# Patient Record
Sex: FEMALE | Race: WHITE | NOT HISPANIC OR LATINO | Employment: FULL TIME | URBAN - METROPOLITAN AREA
[De-identification: names, ages, dates, MRNs, and addresses within clinical notes are randomized per-mention and may not be internally consistent; named-entity substitution may affect disease eponyms.]

---

## 2018-11-12 ENCOUNTER — HOSPITAL ENCOUNTER (OUTPATIENT)
Dept: MAMMOGRAPHY | Facility: HOSPITAL | Age: 58
Discharge: HOME | End: 2018-11-12
Attending: SURGERY | Admitting: SURGERY

## 2021-01-02 ENCOUNTER — HOSPITAL ENCOUNTER (EMERGENCY)
Facility: HOSPITAL | Age: 61
Discharge: HOME | End: 2021-01-02
Attending: EMERGENCY MEDICINE | Admitting: EMERGENCY MEDICINE

## 2021-01-02 VITALS
RESPIRATION RATE: 18 BRPM | WEIGHT: 293 LBS | SYSTOLIC BLOOD PRESSURE: 155 MMHG | OXYGEN SATURATION: 97 % | TEMPERATURE: 98.9 F | HEART RATE: 98 BPM | BODY MASS INDEX: 53.92 KG/M2 | HEIGHT: 62 IN | DIASTOLIC BLOOD PRESSURE: 80 MMHG

## 2021-01-02 DIAGNOSIS — L02.214 ABSCESS OF RIGHT GROIN: Primary | ICD-10-CM

## 2021-01-02 PROCEDURE — 99282 EMERGENCY DEPT VISIT SF MDM: CPT

## 2021-01-02 RX ORDER — HYDROCODONE BITARTRATE AND ACETAMINOPHEN 7.5; 325 MG/1; MG/1
1 TABLET ORAL EVERY 6 HOURS PRN
Qty: 12 TABLET | Refills: 0 | Status: SHIPPED | OUTPATIENT
Start: 2021-01-02

## 2021-01-02 RX ORDER — ONDANSETRON 4 MG/1
4 TABLET, ORALLY DISINTEGRATING ORAL EVERY 8 HOURS PRN
Qty: 10 TABLET | Refills: 0 | Status: SHIPPED | OUTPATIENT
Start: 2021-01-02

## 2021-01-02 RX ORDER — DOXYCYCLINE HYCLATE 100 MG/1
100 CAPSULE ORAL 2 TIMES DAILY
Qty: 20 CAPSULE | Refills: 0 | OUTPATIENT
Start: 2021-01-02 | End: 2022-06-26

## 2021-01-02 NOTE — ED NOTES
Pt reports she lanced part of the wound by herself at home with a sterilzed needle.      Pamela Garrett RN  01/02/21 0664

## 2021-01-02 NOTE — ED PROVIDER NOTES
"Subjective   Chief complaint: Staph infection    60-year-old female presents stating she thinks she has a staph infection.  She states over the past 2 or 3 days she has developed some swelling to the right groin area.  She states she has had abscesses in the past.  She states she had a sterile needle at home and she lanced the wound herself yesterday.  She states it continues to drain a purulent fluid.  She has had no fever.  She denies any alleviating or exacerbating factors.  She does report some nausea and vomiting.      History provided by:  Patient      Review of Systems   Constitutional: Negative for fever.   HENT: Negative for congestion.    Respiratory: Negative for cough and shortness of breath.    Cardiovascular: Negative for chest pain.   Gastrointestinal: Negative for abdominal pain.   Neurological: Negative for headaches.       No past medical history on file.    Allergies   Allergen Reactions   • Ampicillin Rash   • Etodolac Rash   • Sulfamethoxazole-Trimethoprim Anaphylaxis   • Atorvastatin Calcium Nausea And Vomiting   • Fluocinolone Acetonide Unknown - Low Severity     GASTRITIS   • Metformin Nausea And Vomiting     Gastritis   • Brompheniramine Rash   • Sulfa Antibiotics Rash       No past surgical history on file.    No family history on file.    Social History     Socioeconomic History   • Marital status:      Spouse name: Not on file   • Number of children: Not on file   • Years of education: Not on file   • Highest education level: Not on file       /83 (BP Location: Left arm, Patient Position: Sitting)   Pulse 104   Temp 99.5 °F (37.5 °C) (Oral)   Resp 20   Ht 157.5 cm (62\")   Wt (!) 138 kg (305 lb)   SpO2 96%   BMI 55.79 kg/m²       Objective   Physical Exam  Vitals signs and nursing note reviewed.   Constitutional:       Appearance: Normal appearance. She is obese.   HENT:      Head: Normocephalic and atraumatic.   Cardiovascular:      Rate and Rhythm: Normal rate and " regular rhythm.      Heart sounds: Normal heart sounds.   Pulmonary:      Effort: Pulmonary effort is normal. No respiratory distress.      Breath sounds: Normal breath sounds.   Skin:     Comments: There is an area of swelling and induration with tenderness on palpation to the right groin area.  This is not a labial abscess.  It is in the right upper thigh near the groin.  It is actively draining purulent fluid.   Neurological:      Mental Status: She is alert and oriented to person, place, and time.         Procedures           ED Course                                           MDM   The abscess is actively draining.  I see no indication for further I&D at this time.  Patient will be started on doxycycline.  She will also be given a small prescription for Norco and Zofran.  She will follow up with her primary doctor.      Final diagnoses:   Abscess of right groin            Riki Parsons MD  01/02/21 1800

## 2022-06-26 ENCOUNTER — HOSPITAL ENCOUNTER (EMERGENCY)
Facility: HOSPITAL | Age: 62
Discharge: HOME | End: 2022-06-26
Attending: EMERGENCY MEDICINE | Admitting: EMERGENCY MEDICINE

## 2022-06-26 ENCOUNTER — APPOINTMENT (OUTPATIENT)
Dept: GENERAL RADIOLOGY | Facility: HOSPITAL | Age: 62
End: 2022-06-26

## 2022-06-26 VITALS
HEIGHT: 63 IN | HEART RATE: 102 BPM | BODY MASS INDEX: 51.91 KG/M2 | RESPIRATION RATE: 18 BRPM | WEIGHT: 293 LBS | SYSTOLIC BLOOD PRESSURE: 168 MMHG | TEMPERATURE: 98.2 F | OXYGEN SATURATION: 98 % | DIASTOLIC BLOOD PRESSURE: 99 MMHG

## 2022-06-26 DIAGNOSIS — R03.0 ELEVATED BLOOD PRESSURE READING: ICD-10-CM

## 2022-06-26 DIAGNOSIS — J44.1 COPD EXACERBATION: Primary | ICD-10-CM

## 2022-06-26 LAB
ALBUMIN SERPL-MCNC: 3.5 G/DL (ref 3.5–5.2)
ALBUMIN/GLOB SERPL: 1 G/DL
ALP SERPL-CCNC: 79 U/L (ref 39–117)
ALT SERPL W P-5'-P-CCNC: 25 U/L (ref 1–33)
ANION GAP SERPL CALCULATED.3IONS-SCNC: 11 MMOL/L (ref 5–15)
AST SERPL-CCNC: 28 U/L (ref 1–32)
BASOPHILS # BLD AUTO: 0 10*3/MM3 (ref 0–0.2)
BASOPHILS NFR BLD AUTO: 0.4 % (ref 0–1.5)
BILIRUB SERPL-MCNC: 0.5 MG/DL (ref 0–1.2)
BUN SERPL-MCNC: 11 MG/DL (ref 8–23)
BUN/CREAT SERPL: 16.9 (ref 7–25)
CALCIUM SPEC-SCNC: 9 MG/DL (ref 8.6–10.5)
CHLORIDE SERPL-SCNC: 95 MMOL/L (ref 98–107)
CO2 SERPL-SCNC: 28 MMOL/L (ref 22–29)
CREAT SERPL-MCNC: 0.65 MG/DL (ref 0.57–1)
DEPRECATED RDW RBC AUTO: 41.1 FL (ref 37–54)
EGFRCR SERPLBLD CKD-EPI 2021: 100.9 ML/MIN/1.73
EOSINOPHIL # BLD AUTO: 0.1 10*3/MM3 (ref 0–0.4)
EOSINOPHIL NFR BLD AUTO: 1.1 % (ref 0.3–6.2)
ERYTHROCYTE [DISTWIDTH] IN BLOOD BY AUTOMATED COUNT: 13.6 % (ref 12.3–15.4)
GLOBULIN UR ELPH-MCNC: 3.6 GM/DL
GLUCOSE SERPL-MCNC: 417 MG/DL (ref 65–99)
HCT VFR BLD AUTO: 47.1 % (ref 34–46.6)
HGB BLD-MCNC: 15.8 G/DL (ref 12–15.9)
LYMPHOCYTES # BLD AUTO: 1.6 10*3/MM3 (ref 0.7–3.1)
LYMPHOCYTES NFR BLD AUTO: 15.1 % (ref 19.6–45.3)
MCH RBC QN AUTO: 28.8 PG (ref 26.6–33)
MCHC RBC AUTO-ENTMCNC: 33.6 G/DL (ref 31.5–35.7)
MCV RBC AUTO: 86 FL (ref 79–97)
MONOCYTES # BLD AUTO: 0.8 10*3/MM3 (ref 0.1–0.9)
MONOCYTES NFR BLD AUTO: 7.5 % (ref 5–12)
NEUTROPHILS NFR BLD AUTO: 75.9 % (ref 42.7–76)
NEUTROPHILS NFR BLD AUTO: 8 10*3/MM3 (ref 1.7–7)
NRBC BLD AUTO-RTO: 0 /100 WBC (ref 0–0.2)
NT-PROBNP SERPL-MCNC: 33.7 PG/ML (ref 0–900)
PLATELET # BLD AUTO: 186 10*3/MM3 (ref 140–450)
PMV BLD AUTO: 8.2 FL (ref 6–12)
POTASSIUM SERPL-SCNC: 4.3 MMOL/L (ref 3.5–5.2)
PROCALCITONIN SERPL-MCNC: 0.13 NG/ML (ref 0–0.25)
PROT SERPL-MCNC: 7.1 G/DL (ref 6–8.5)
QT INTERVAL: 358 MS
RBC # BLD AUTO: 5.48 10*6/MM3 (ref 3.77–5.28)
SODIUM SERPL-SCNC: 134 MMOL/L (ref 136–145)
TROPONIN T SERPL-MCNC: <0.01 NG/ML (ref 0–0.03)
WBC NRBC COR # BLD: 10.5 10*3/MM3 (ref 3.4–10.8)

## 2022-06-26 PROCEDURE — 84145 PROCALCITONIN (PCT): CPT | Performed by: EMERGENCY MEDICINE

## 2022-06-26 PROCEDURE — 71045 X-RAY EXAM CHEST 1 VIEW: CPT

## 2022-06-26 PROCEDURE — 94761 N-INVAS EAR/PLS OXIMETRY MLT: CPT

## 2022-06-26 PROCEDURE — 94664 DEMO&/EVAL PT USE INHALER: CPT

## 2022-06-26 PROCEDURE — 94799 UNLISTED PULMONARY SVC/PX: CPT

## 2022-06-26 PROCEDURE — 85025 COMPLETE CBC W/AUTO DIFF WBC: CPT | Performed by: EMERGENCY MEDICINE

## 2022-06-26 PROCEDURE — 84484 ASSAY OF TROPONIN QUANT: CPT | Performed by: EMERGENCY MEDICINE

## 2022-06-26 PROCEDURE — 99283 EMERGENCY DEPT VISIT LOW MDM: CPT

## 2022-06-26 PROCEDURE — 94640 AIRWAY INHALATION TREATMENT: CPT

## 2022-06-26 PROCEDURE — 83880 ASSAY OF NATRIURETIC PEPTIDE: CPT | Performed by: EMERGENCY MEDICINE

## 2022-06-26 PROCEDURE — 80053 COMPREHEN METABOLIC PANEL: CPT | Performed by: EMERGENCY MEDICINE

## 2022-06-26 PROCEDURE — 93005 ELECTROCARDIOGRAM TRACING: CPT | Performed by: EMERGENCY MEDICINE

## 2022-06-26 RX ORDER — IPRATROPIUM BROMIDE AND ALBUTEROL SULFATE 2.5; .5 MG/3ML; MG/3ML
3 SOLUTION RESPIRATORY (INHALATION) ONCE
Status: COMPLETED | OUTPATIENT
Start: 2022-06-26 | End: 2022-06-26

## 2022-06-26 RX ORDER — DOXYCYCLINE 100 MG/1
100 CAPSULE ORAL 2 TIMES DAILY
Qty: 14 CAPSULE | Refills: 0 | Status: SHIPPED | OUTPATIENT
Start: 2022-06-26

## 2022-06-26 RX ORDER — ALBUTEROL SULFATE 2.5 MG/3ML
2.5 SOLUTION RESPIRATORY (INHALATION) EVERY 4 HOURS PRN
Qty: 30 EACH | Refills: 0 | Status: SHIPPED | OUTPATIENT
Start: 2022-06-26

## 2022-06-26 RX ORDER — CLONIDINE HYDROCHLORIDE 0.1 MG/1
0.1 TABLET ORAL ONCE
Status: COMPLETED | OUTPATIENT
Start: 2022-06-26 | End: 2022-06-26

## 2022-06-26 RX ADMIN — CLONIDINE HYDROCHLORIDE 0.1 MG: 0.1 TABLET ORAL at 17:01

## 2022-06-26 RX ADMIN — IPRATROPIUM BROMIDE AND ALBUTEROL SULFATE 3 ML: .5; 3 SOLUTION RESPIRATORY (INHALATION) at 16:30

## 2022-06-26 NOTE — ED PROVIDER NOTES
Subjective   History of Present Illness  History Provided By: Patient    Chief Complaint: Shortness of breath  Onset: 1 month ago  Timing: Worsening  Location: Pulmonary  Quality: Ran out of albuterol nebulizers and having increasing wheezing  Severity: Moderate  Modifying Factors: Albuterol inhalers not helping as much as nebulizer did previously    Other: Patient with prior medical history of COPD presents for cough, shortness of breath, wheezing.  Been going on for approximately a month but worsened over the last several days.  Increasing shortness of breath.  States that she feels like the shortness of breath causing her to have panic attacks.      Review of Systems   Constitutional: Negative for chills and fever.   Respiratory: Positive for cough, shortness of breath and wheezing.    Cardiovascular: Positive for leg swelling. Negative for chest pain.   All other systems reviewed and are negative.      History reviewed. No pertinent past medical history.    Allergies   Allergen Reactions   • Ampicillin Rash   • Etodolac Rash   • Sulfamethoxazole-Trimethoprim Anaphylaxis   • Atorvastatin Calcium Nausea And Vomiting   • Fluocinolone Acetonide Unknown - Low Severity     GASTRITIS   • Metformin Nausea And Vomiting     Gastritis   • Brompheniramine Rash   • Sulfa Antibiotics Rash       History reviewed. No pertinent surgical history.    History reviewed. No pertinent family history.    Social History     Socioeconomic History   • Marital status:            Objective   Physical Exam  Constitutional:  No acute distress.  Head:  Atraumatic.  Normocephalic.   Eyes:  No scleral icterus. Normal conjunctiva  ENT:  Moist mucosa.  No nasal discharge present.  Cardiovascular:  Well perfused.  Equal pulses.  Regular rate.  Normal capillary refill.    Pulmonary/Chest:  No respiratory distress.  Airway patent.  No tachypnea.  No accessory muscle usage.  Moderate expiratory wheezes bilaterally  Abdominal:  Non-distended.  Non-tender.   Extremities: 2+ bilateral peripheral edema.  No Deformity  Skin:  Warm, dry  Neurological:  Alert, awake, and appropriate.  Normal speech.      Procedures           ED Course  ED Course as of 06/26/22 1745   Sun Jun 26, 2022   1635 I spoke with the patient about her glucose, patient does report she has a history of diabetes, she reports that she has not been taking her insulin because she is not eating.  Advised the patient that her blood sugar was 417, and it would be advisable to decrease her blood sugar and we can monitor here in the ED, patient refuses insulin.    Sitting up on side the bed, she is resting comfortably.  No acute distress. [LB]   1726 Spoke with the patient again regarding her treatment plan.  Patient is very argumentative and defensive regarding her blood sugar, her insulin, and therapy.  I advised patient she would need to discuss her medications with her primary care provider.  I also advise follow-up regarding her blood pressure.  We discussed admission, and patient feels that she would not benefit from admission.  She is resting comfortably.  No acute respiratory distress. [LB]      ED Course User Index  [LB] Sujata Zamora, APRN                                           MDM  Number of Diagnoses or Management Options  COPD exacerbation (HCC)  Elevated blood pressure reading  Diagnosis management comments: Care patient from previous provider with DuoNeb and lab result pending.  I spoke with the patient extensively regarding her medications, please see my work-up notes.  Given patient has had symptoms for the past 4-week she will be given doxycycline.  Of advised her to follow-up with her primary care provider.  She is without any acute distress at disposition.       Amount and/or Complexity of Data Reviewed  Clinical lab tests: reviewed  Tests in the radiology section of CPT®: reviewed  Tests in the medicine section of CPT®: reviewed      EKG # 1  Signed and interpreted by the  EP.  Time Interpreted: 3:05 PM  Rate: 94  Rhythm: Normal sinus rhythm  Axis: Normal axis  Intervals: Normal intervals  ST Segments: No acute ischemic     Comparison: No significant change from October 12, 2018 EKG    Patient with bilateral expiratory wheezes.  Will give DuoNeb.  Has lower extremity edema but do not hear crackles.  Do not think patient appears volume overloaded.  Transfer of care at 4:30 PM.  Pending remainder labs, chest x-ray results, reevaluation after DuoNeb.  Suspect patient will to go home with refill of her albuterol nebulizer treatments.  Would recommend holding steroids given patient's elevated blood sugar.  Believe better medication compliance is all she will need.    Final diagnoses:   COPD exacerbation (HCC)   Elevated blood pressure reading       ED Disposition  ED Disposition     ED Disposition   Discharge    Condition   Stable    Comment   --             Mike Marino MD  [address] [facility] [place] [address] [phone]    Schedule an appointment as soon as possible for a visit       [facility] EMERGENCY DEPARTMENT  [address] [facility] [address] [phone]             Medication List      New Prescriptions    albuterol (2.5 MG/3ML) 0.083% nebulizer solution  Commonly known as: PROVENTIL  Take 2.5 mg by nebulization Every 4 (Four) Hours As Needed for Wheezing or Shortness of Air.     doxycycline 100 MG capsule  Commonly known as: MONODOX  Take 1 capsule by mouth 2 (Two) Times a Day.        Stop    doxycycline 100 MG capsule  Commonly known as: VIBRAMYCIN           Where to Get Your Medications      These medications were sent to [facility]/pharmacy #[id number] - [facility] [place] - [address] [phone] [place] [phone]   [address] [facility] [place] [address]    Hours: 24-hours Phone: [phone]   · albuterol (2.5 MG/3ML) 0.083% nebulizer solution  · doxycycline 100 MG capsule          Sujata Zamora, APRN  06/26/22 9903

## 2023-03-16 NOTE — DISCHARGE INSTRUCTIONS
Addended by: SHELIA GOFF on: 3/16/2023 07:30 AM     Modules accepted: Orders     Follow-up with your primary doctor.  Return to the emergency room for any new or worsening symptoms or if you have any other questions or concerns.  Take medications as prescribed.  Do not drive or drink alcohol while taking pain medication.

## 2023-09-28 ENCOUNTER — HOSPITAL ENCOUNTER (EMERGENCY)
Facility: HOSPITAL | Age: 63
Discharge: HOME | End: 2023-09-28
Attending: EMERGENCY MEDICINE
Payer: COMMERCIAL

## 2023-09-28 ENCOUNTER — APPOINTMENT (OUTPATIENT)
Dept: GENERAL RADIOLOGY | Facility: HOSPITAL | Age: 63
End: 2023-09-28
Payer: COMMERCIAL

## 2023-09-28 VITALS
OXYGEN SATURATION: 98 % | RESPIRATION RATE: 19 BRPM | SYSTOLIC BLOOD PRESSURE: 193 MMHG | BODY MASS INDEX: 42.17 KG/M2 | HEART RATE: 103 BPM | WEIGHT: 238 LBS | DIASTOLIC BLOOD PRESSURE: 109 MMHG | HEIGHT: 63 IN | TEMPERATURE: 97.6 F

## 2023-09-28 DIAGNOSIS — S60.221A CONTUSION OF RIGHT HAND, INITIAL ENCOUNTER: ICD-10-CM

## 2023-09-28 DIAGNOSIS — S80.01XA CONTUSION OF RIGHT KNEE, INITIAL ENCOUNTER: Primary | ICD-10-CM

## 2023-09-28 PROCEDURE — 73130 X-RAY EXAM OF HAND: CPT

## 2023-09-28 PROCEDURE — 73590 X-RAY EXAM OF LOWER LEG: CPT

## 2023-09-28 PROCEDURE — 73560 X-RAY EXAM OF KNEE 1 OR 2: CPT

## 2023-09-28 PROCEDURE — 99283 EMERGENCY DEPT VISIT LOW MDM: CPT

## 2023-09-28 RX ORDER — HYDROCODONE BITARTRATE AND ACETAMINOPHEN 10; 325 MG/1; MG/1
1 TABLET ORAL EVERY 4 HOURS PRN
Qty: 10 TABLET | Refills: 0 | Status: SHIPPED | OUTPATIENT
Start: 2023-09-28

## 2023-09-28 RX ORDER — HYDROCODONE BITARTRATE AND ACETAMINOPHEN 7.5; 325 MG/1; MG/1
1 TABLET ORAL ONCE
Status: COMPLETED | OUTPATIENT
Start: 2023-09-28 | End: 2023-09-28

## 2023-09-28 RX ADMIN — HYDROCODONE BITARTRATE AND ACETAMINOPHEN 1 TABLET: 7.5; 325 TABLET ORAL at 21:12

## 2023-09-29 NOTE — ED PROVIDER NOTES
MD ATTESTATION NOTE    The JULIAN and I have discussed this patient's history, physical exam, and treatment plan.  I have reviewed the documentation and personally had a face to face interaction with the patient. I affirm the documentation and agree with the treatment and plan.  The attached note describes my personal findings.      I provided a substantive portion of the care of the patient.  I personally performed the physical exam in its entirety, and below are my findings.      Brief HPI: 62-year-old female who presents the emergency room today after a fall yesterday.  The patient is complaining of right hand and right knee pain.  She states she did not hit her head or lose consciousness.  She states that her hips are not bothering her.    General : 62-year-old patient is awake alert and oriented no acute distress  HEENT: NCAT: C-spine nontender  Ext: Tenderness and swelling to dorsum of right hand with good range of motion of right hand.  Patient has tenderness to her right knee and proximal tibia.  The patient has no hip pain.  No spine pain.  She has 3+ pedal edema bilaterally which she states is chronic  Skin: No rash  Neuro: Awake with a nonfocal neuro exam  Psych: Normal mood and affect      Plan: We will x-ray the patient's hand, knee and tib-fib for further evaluation.  My independent interpretation the patient's right hand, knee and tib-fib x-rays are negative acute.  We will Ace wrap her knee and I will call her short course of narcotics and have her follow-up with orthopedic doctor.     Darius Coon MD  09/28/23 8320

## 2023-09-29 NOTE — ED PROVIDER NOTES
EMERGENCY DEPARTMENT ENCOUNTER    Room Number:  T02/02  PCP: Mike Marino MD      HPI:  Chief Complaint: Mechanical fall with right hand and right lower extremity injury  A complete HPI/ROS/PMH/PSH/SH/FH are unobtainable due to: Nothing  Context: Lisa Smith is a 62 y.o. female who presents to the ED c/o mechanical fall with right hand and right lower extremity injury.  Per the patient has chronic right knee pain and is bone-on-bone.  She states her right knee gave out causing her to fall to the ground directly on the right knee and the right hand.  She denies hitting her head or sustaining injury to the chest wall, abdomen, head, neck, back, hips, pelvis.  She states the pain in the knee is moderate at rest and significant when she attempts to ambulate.  The pain in the right hand is exacerbated when she attempts to  and grasp objects.  Patient states she is currently followed by [facility]..          PAST MEDICAL HISTORY  Active Ambulatory Problems     Diagnosis Date Noted    No Active Ambulatory Problems     Resolved Ambulatory Problems     Diagnosis Date Noted    No Resolved Ambulatory Problems     No Additional Past Medical History         PAST SURGICAL HISTORY  No past surgical history on file.      FAMILY HISTORY  No family history on file.      SOCIAL HISTORY  Social History     Socioeconomic History    Marital status:          ALLERGIES  Ampicillin, Etodolac, Sulfamethoxazole-trimethoprim, Atorvastatin calcium, Fluocinolone acetonide, Metformin, Brompheniramine, and Sulfa antibiotics        REVIEW OF SYSTEMS  Review of Systems     All systems reviewed and negative except for those discussed in HPI.       PHYSICAL EXAM  ED Triage Vitals [09/28/23 2021]   Temp Heart Rate Resp BP SpO2   97.6 °F (36.4 °C) 103 19 -- 98 %      Temp src Heart Rate Source Patient Position BP Location FiO2 (%)   -- -- -- -- --       Physical Exam      GENERAL: Very pleasant, nontoxic, no acute  distress  HENT: nares patent  EYES: no scleral icterus  CV: regular rhythm, normal rate DP and PT pulses +1 or 2 in palpable right lower extremity  RESPIRATORY: normal effort  ABDOMEN: soft, nontender, no signs of trauma  MUSCULOSKELETAL: Right hand: Soft tissue swelling and TTP over the fourth and fifth MCPs.  No obvious deformity.  Right knee: TTP over the patella and proximal tib-fib.  Compartments are soft.    NEURO: alert, moves all extremities, follows commands  PSYCH:  calm, cooperative  SKIN: warm, dry    Vital signs and nursing notes reviewed.          LAB RESULTS  No results found for this or any previous visit (from the past 24 hour(s)).    Ordered the above labs and reviewed the results.        RADIOLOGY  XR Knee 1 or 2 View Right, XR Tibia Fibula 2 View Right, XR Hand 3+ View Right    Result Date: 9/28/2023  XR KNEE 1 OR 2 VW RIGHT-, XR TIBIA FIBULA 2 VW RIGHT-, XR HAND 3+ VW RIGHT-09/28/2023  HISTORY: Fell with multiple injuries.  RIGHT KNEE 2 VIEWS  AP and lateral views of the right knee show tibiofemoral joint space narrowing most pronounced medially. There is soft tissue swelling of the lower extremity. Hypertrophic changes are seen.  No fractures or other acute bony abnormalities are seen.  RIGHT TIBIA AND FIBULA 2 VIEWS  AP and lateral views of the right tibia and fibula show soft tissue swelling of the lower leg with no fractures or other acute bony abnormalities.  RIGHT HAND 3 VIEWS  There may be minimal degenerative arthritis of the interphalangeal joints. Mildly heterogeneous density of the midshaft of the right fifth metacarpal is seen. There may be some minimal bony expansion. This is probably a benign lesion such as an enchondroma.  No acute fractures or dislocations are seen.  Comment: Review report.        Ordered the above noted radiological studies. Reviewed by me in PACS.          PROCEDURES  Procedures          MEDICATIONS GIVEN IN ER  Medications   HYDROcodone-acetaminophen (NORCO)  7.5-325 MG per tablet 1 tablet (1 tablet Oral Given 9/28/23 2112)         MEDICAL DECISION MAKING, PROGRESS, and CONSULTS    Discussion below represents my analysis of pertinent findings related to patient's condition, differential diagnosis, treatment plan and final disposition.      Orders placed during this visit:  Orders Placed This Encounter   Procedures    XR Knee 1 or 2 View Right    XR Tibia Fibula 2 View Right    XR Hand 3+ View Right         Additional sources:  - Discussed/obtained information from independent historians: None available  Additional information was obtained to confirm the patient's history.    - External (non-ED) record review: Patient was seen by Dr. Roberts with [facility] and had PFTs performed 9/25/2023.  Findings were consistent with restrictive pattern lung disease.            - Chronic or social conditions impacting care: None        Differential diagnosis:    Right hand fracture, patella fracture, tibial plateau fracture, proximal tib-fib fracture.  Will obtain plain film x-rays of the right knee, right tib-fib, right hand to further evaluate        Independent interpretation of labs, radiology studies, and discussions with consultants:  ED Course as of 09/29/23 0535   Thu Sep 28, 2023   2112 I viewed the patient's right hand, right tib-fib, right knee x-rays.  There appears to be an old healed fracture in the right hand to the fifth MC.  There is significant DJD in the right knee.  I see no acute fracture.  We will wait for the radiologist's official reads. [RC]   9060 Official right hand x-ray:RIGHT HAND 3 VIEWS     There may be minimal degenerative arthritis of the interphalangeal  joints. Mildly heterogeneous density of the midshaft of the right fifth  metacarpal is seen. There may be some minimal bony expansion. This is  probably a benign lesion such as an enchondroma.     No acute fractures or dislocations are seen.     Comment: Review report.       [RC]   4537  RIGHT TIBIA AND FIBULA 2 VIEWS     AP and lateral views of the right tibia and fibula show soft tissue  swelling of the lower leg with no fractures or other acute bony  abnormalities.      [RC]   2146 RIGHT KNEE 2 VIEWS     AP and lateral views of the right knee show tibiofemoral joint space  narrowing most pronounced medially. There is soft tissue swelling of the  lower extremity. Hypertrophic changes are seen.     No fractures or other acute bony abnormalities are seen.      [RC]   2146 Patient is followed by Dr. Omer Cabrera and ODIN Houston.  We will treat with multiple Ace wrap's [RC]      ED Course User Index  [RC] Jaime Wood III, PA               DIAGNOSIS  Final diagnoses:   Contusion of right knee, initial encounter   Contusion of right hand, initial encounter         DISPOSITION  DISCHARGE    Patient discharged in stable condition.    Reviewed implications of results, diagnosis, meds, responsibility to follow up, warning signs and symptoms of possible worsening, potential complications and reasons to return to ER.    Patient/Family voiced understanding of above instructions.    Discussed plan for discharge, as there is no emergent indication for admission. Patient referred to primary care provider for BP management due to today's BP. Pt/family is agreeable and understands need for follow up and repeat testing.  Pt is aware that discharge does not mean that nothing is wrong but it indicates no emergency is present that requires admission and they must continue care with follow-up as given below or physician of their choice.     FOLLOW-UP  Edvin Cabrera II, MD  [address] [facility] [address]  [place] [id number] [phone]    Schedule an appointment as soon as possible for a visit   For further evaluation and treatment    Mike Marino MD  [address] [place] [id number] [phone]    Schedule an appointment as soon as possible for a visit   For further  evaluation and treatment of all other issues         Medication List        New Prescriptions      Diclofenac Sodium 1 % gel gel  Commonly known as: VOLTAREN  Apply 4 g topically to the appropriate area as directed 3 (Three) Times a Day.     HYDROcodone-acetaminophen  MG per tablet  Commonly known as: NORCO  Take 1 tablet by mouth Every 4 (Four) Hours As Needed for Moderate Pain.  Replaces: HYDROcodone-acetaminophen 7.5-325 MG per tablet            Stop      HYDROcodone-acetaminophen 7.5-325 MG per tablet  Commonly known as: NORCO  Replaced by: HYDROcodone-acetaminophen  MG per tablet               Where to Get Your Medications        These medications were sent to [facility]/pharmacy #[id number] - [place], [place] - [address] [facility] - [phone]  - [phone] FX  [address] [facility] [place] [address]      Phone: [phone]   Diclofenac Sodium 1 % gel gel  HYDROcodone-acetaminophen  MG per tablet            Latest Documented Vital Signs:  As of 05:35 EDT  BP- (!) 193/109 HR- 103 Temp- 97.6 °F (36.4 °C) O2 sat- 98%      --    Please note that portions of this were completed with a voice recognition program.       Note Disclaimer: At [facility], we believe that sharing information builds trust and better relationships. You are receiving this note because you are receiving care at [facility] or recently visited. It is possible you will see health information before a provider has talked with you about it. This kind of information can be easy to misunderstand. To help you fully understand what it means for your health, we urge you to discuss this note with your provider.         Jaime Wood III, PA  09/29/23 0555

## 2023-09-29 NOTE — DISCHARGE INSTRUCTIONS
Use your walker or crutch as needed.  I would use the Ace wrap's provided for the next 5 to 7 days as needed.  Follow-up and see orthopedist above for further evaluation.  Follow-up with your family physician for interim management and all other issues.  Return to the ER with worsening symptoms or should you develop new symptoms we did not address at today's visit.

## 2025-01-08 ENCOUNTER — HOSPITAL ENCOUNTER (OUTPATIENT)
Dept: CARDIOLOGY | Facility: HOSPITAL | Age: 65
Discharge: HOME | End: 2025-01-08
Payer: COMMERCIAL

## 2025-01-08 ENCOUNTER — TRANSCRIBE ORDERS (OUTPATIENT)
Dept: ADMINISTRATIVE | Facility: HOSPITAL | Age: 65
End: 2025-01-08
Payer: COMMERCIAL

## 2025-01-08 ENCOUNTER — LAB (OUTPATIENT)
Dept: LAB | Facility: HOSPITAL | Age: 65
End: 2025-01-08
Payer: COMMERCIAL

## 2025-01-08 DIAGNOSIS — Z01.818 PRE-OP TESTING: ICD-10-CM

## 2025-01-08 DIAGNOSIS — Z01.818 PRE-OP TESTING: Primary | ICD-10-CM

## 2025-01-08 DIAGNOSIS — H35.371 RIGHT EPIRETINAL MEMBRANE: ICD-10-CM

## 2025-01-08 LAB
ANION GAP SERPL CALCULATED.3IONS-SCNC: 12 MMOL/L (ref 5–15)
BASOPHILS # BLD AUTO: 0.05 10*3/MM3 (ref 0–0.2)
BASOPHILS NFR BLD AUTO: 0.4 % (ref 0–1.5)
BUN SERPL-MCNC: 14 MG/DL (ref 8–23)
BUN/CREAT SERPL: 20.3 (ref 7–25)
CALCIUM SPEC-SCNC: 10.1 MG/DL (ref 8.6–10.5)
CHLORIDE SERPL-SCNC: 93 MMOL/L (ref 98–107)
CO2 SERPL-SCNC: 29 MMOL/L (ref 22–29)
CREAT SERPL-MCNC: 0.69 MG/DL (ref 0.57–1)
DEPRECATED RDW RBC AUTO: 42.2 FL (ref 37–54)
EGFRCR SERPLBLD CKD-EPI 2021: 97.7 ML/MIN/1.73
EOSINOPHIL # BLD AUTO: 0.11 10*3/MM3 (ref 0–0.4)
EOSINOPHIL NFR BLD AUTO: 0.9 % (ref 0.3–6.2)
ERYTHROCYTE [DISTWIDTH] IN BLOOD BY AUTOMATED COUNT: 12.8 % (ref 12.3–15.4)
GLUCOSE SERPL-MCNC: 386 MG/DL (ref 65–99)
HCT VFR BLD AUTO: 48.7 % (ref 34–46.6)
HGB BLD-MCNC: 15.7 G/DL (ref 12–15.9)
IMM GRANULOCYTES # BLD AUTO: 0.07 10*3/MM3 (ref 0–0.05)
IMM GRANULOCYTES NFR BLD AUTO: 0.5 % (ref 0–0.5)
LYMPHOCYTES # BLD AUTO: 2.83 10*3/MM3 (ref 0.7–3.1)
LYMPHOCYTES NFR BLD AUTO: 22.2 % (ref 19.6–45.3)
MCH RBC QN AUTO: 29.1 PG (ref 26.6–33)
MCHC RBC AUTO-ENTMCNC: 32.2 G/DL (ref 31.5–35.7)
MCV RBC AUTO: 90.2 FL (ref 79–97)
MONOCYTES # BLD AUTO: 0.71 10*3/MM3 (ref 0.1–0.9)
MONOCYTES NFR BLD AUTO: 5.6 % (ref 5–12)
NEUTROPHILS NFR BLD AUTO: 70.4 % (ref 42.7–76)
NEUTROPHILS NFR BLD AUTO: 8.98 10*3/MM3 (ref 1.7–7)
NRBC BLD AUTO-RTO: 0 /100 WBC (ref 0–0.2)
PLATELET # BLD AUTO: 225 10*3/MM3 (ref 140–450)
PMV BLD AUTO: 11 FL (ref 6–12)
POTASSIUM SERPL-SCNC: 4.3 MMOL/L (ref 3.5–5.2)
RBC # BLD AUTO: 5.4 10*6/MM3 (ref 3.77–5.28)
SODIUM SERPL-SCNC: 134 MMOL/L (ref 136–145)
WBC NRBC COR # BLD AUTO: 12.75 10*3/MM3 (ref 3.4–10.8)

## 2025-01-08 PROCEDURE — 93005 ELECTROCARDIOGRAM TRACING: CPT | Performed by: OPHTHALMOLOGY

## 2025-01-08 PROCEDURE — 85025 COMPLETE CBC W/AUTO DIFF WBC: CPT

## 2025-01-08 PROCEDURE — 36415 COLL VENOUS BLD VENIPUNCTURE: CPT

## 2025-01-08 PROCEDURE — 80048 BASIC METABOLIC PNL TOTAL CA: CPT

## 2025-01-09 LAB
QT INTERVAL: 374 MS
QTC INTERVAL: 455 MS